# Patient Record
Sex: FEMALE | Race: WHITE | NOT HISPANIC OR LATINO
[De-identification: names, ages, dates, MRNs, and addresses within clinical notes are randomized per-mention and may not be internally consistent; named-entity substitution may affect disease eponyms.]

---

## 2017-01-10 ENCOUNTER — APPOINTMENT (OUTPATIENT)
Dept: DERMATOLOGY | Facility: CLINIC | Age: 22
End: 2017-01-10

## 2017-01-10 VITALS
SYSTOLIC BLOOD PRESSURE: 124 MMHG | WEIGHT: 115 LBS | DIASTOLIC BLOOD PRESSURE: 82 MMHG | HEIGHT: 62 IN | BODY MASS INDEX: 21.16 KG/M2

## 2017-01-10 DIAGNOSIS — L85.8 OTHER SPECIFIED EPIDERMAL THICKENING: ICD-10-CM

## 2017-01-10 DIAGNOSIS — L70.9 ACNE, UNSPECIFIED: ICD-10-CM

## 2017-01-10 DIAGNOSIS — L44.2 LICHEN STRIATUS: ICD-10-CM

## 2017-01-10 RX ORDER — CLINDAMYCIN PHOSPHATE AND TRETINOIN 10; .25 MG/G; MG/G
1.2-0.025 GEL TOPICAL
Qty: 1 | Refills: 2 | Status: ACTIVE | COMMUNITY
Start: 2017-01-10 | End: 1900-01-01

## 2017-01-10 RX ORDER — TRIAMCINOLONE ACETONIDE 1 MG/G
0.1 CREAM TOPICAL
Qty: 1 | Refills: 2 | Status: ACTIVE | COMMUNITY
Start: 2017-01-10 | End: 1900-01-01

## 2017-03-17 ENCOUNTER — APPOINTMENT (OUTPATIENT)
Dept: DERMATOLOGY | Facility: CLINIC | Age: 22
End: 2017-03-17

## 2017-07-26 ENCOUNTER — APPOINTMENT (OUTPATIENT)
Dept: OBGYN | Facility: CLINIC | Age: 22
End: 2017-07-26

## 2018-02-23 ENCOUNTER — TRANSCRIPTION ENCOUNTER (OUTPATIENT)
Age: 23
End: 2018-02-23

## 2018-06-13 ENCOUNTER — TRANSCRIPTION ENCOUNTER (OUTPATIENT)
Age: 23
End: 2018-06-13

## 2019-11-14 ENCOUNTER — TRANSCRIPTION ENCOUNTER (OUTPATIENT)
Age: 24
End: 2019-11-14

## 2019-12-31 ENCOUNTER — APPOINTMENT (OUTPATIENT)
Dept: ENDOCRINOLOGY | Facility: CLINIC | Age: 24
End: 2019-12-31

## 2020-02-24 ENCOUNTER — APPOINTMENT (OUTPATIENT)
Dept: ENDOCRINOLOGY | Facility: CLINIC | Age: 25
End: 2020-02-24
Payer: COMMERCIAL

## 2020-02-24 VITALS
HEIGHT: 62 IN | WEIGHT: 126 LBS | TEMPERATURE: 98.2 F | OXYGEN SATURATION: 99 % | SYSTOLIC BLOOD PRESSURE: 122 MMHG | HEART RATE: 98 BPM | DIASTOLIC BLOOD PRESSURE: 78 MMHG | BODY MASS INDEX: 23.19 KG/M2

## 2020-02-24 PROCEDURE — 76536 US EXAM OF HEAD AND NECK: CPT

## 2020-02-24 PROCEDURE — 99214 OFFICE O/P EST MOD 30 MIN: CPT

## 2020-02-24 NOTE — PROCEDURE
[Eng[employer] e 2008 model, 10-12 MHz frequencies] : multiple real time longitudinal and transverse images were obtained using a high resolution ultrasound with a linear transducer, Eng[employer] e 2008 model, 10-12 MHz frequencies. All measurements will be reported as longitudinal x azalia-posterior x transverse. [Hypothyroidism] : hypothyroidism [FreeTextEntry1] : 3.57 x 2.02 x 2.38 [There are no distinct nodules visualized.] : There are no distinct nodules visualized. [] : a heterogeneous parenchyma [FreeTextEntry5] : 3.29 x 2.19 x 2.43 [FreeTextEntry2] : 1.26

## 2020-02-24 NOTE — IMPRESSION
[FreeTextEntry1] : Enlarged isthmus . Otherwise thyroid heterogenous without distinct nodularity.  Reassess one year.

## 2020-03-15 NOTE — HISTORY OF PRESENT ILLNESS
[FreeTextEntry1] : Ms. CORLEY  is a 24 year  year old female  who presents for endocrine evaluation We had alt met about one year ago at [facility] office.  She does have hzx of recurrent bouts of thyroiditis-about 5-6 each  resolving with normal tft's subsequently.\par Recently noted thyoid had enlarged and noted difficulty sleeping  and cant stay asleep -was dreaming unusually.  Energuy level down somewhat. She presents with regard to a history of Hashimoto's thyroiditis . Additional medical history includes that of acne not taking any medicataion\par Strong FH on dad side for autoimmune with hashimitos and scleroderma, pat aunt with Graves, Pat GM  with hashimitos\par TSH now at 47.5 qith Free T4 at 0.7 Brissa ab's strongly pos ( TG and TPO).\par Menses shorter oflate-did just have cycle.\par Moods off feels "spacy" a ttimes\par Memory not as good.\par Vit d 25-OH at 14.\par

## 2020-03-15 NOTE — PHYSICAL EXAM
[Alert] : alert [No Acute Distress] : no acute distress [Well Nourished] : well nourished [Well Developed] : well developed [Normal Sclera/Conjunctiva] : normal sclera/conjunctiva [No Proptosis] : no proptosis [EOMI] : extra ocular movement intact [Thyroid Not Enlarged] : the thyroid was not enlarged [Normal Oropharynx] : the oropharynx was normal [No Thyroid Nodules] : there were no palpable thyroid nodules [No Respiratory Distress] : no respiratory distress [Normal Rate] : heart rate was normal  [No Accessory Muscle Use] : no accessory muscle use [Clear to Auscultation] : lungs were clear to auscultation bilaterally [Normal S1, S2] : normal S1 and S2 [Pedal Pulses Normal] : the pedal pulses are present [No Edema] : there was no peripheral edema [Regular Rhythm] : with a regular rhythm [Normal Bowel Sounds] : normal bowel sounds [Not Tender] : non-tender [Soft] : abdomen soft [Not Distended] : not distended [Post Cervical Nodes] : posterior cervical nodes [Axillary Nodes] : axillary nodes [Anterior Cervical Nodes] : anterior cervical nodes [Spine Straight] : spine straight [Normal] : normal and non tender [No Spinal Tenderness] : no spinal tenderness [Normal Gait] : normal gait [Normal Strength/Tone] : muscle strength and tone were normal [No Stigmata of Cushings Syndrome] : no stigmata of cushings syndrome [No Rash] : no rash [Oriented x3] : oriented to person, place, and time [No Tremors] : no tremors [Normal Reflexes] : deep tendon reflexes were 2+ and symmetric [Acanthosis Nigricans] : no acanthosis nigricans

## 2020-03-15 NOTE — HISTORY OF PRESENT ILLNESS
[FreeTextEntry1] : Ms. CORLEY  is a 24 year  year old female  who presents for endocrine evaluation We had alt met about one year ago at [facility] office.  She does have hzx of recurrent bouts of thyroiditis-about 5-6 each  resolving with normal tft's subsequently.\par Recently noted thyoid had enlarged and noted difficulty sleeping  and cant stay asleep -was dreaming unusually.  Energuy level down somewhat. She presents with regard to a history of Hashimoto's thyroiditis . Additional medical history includes that of acne not taking any medicataion\par Strong FH on dad side for autoimmune with hashimitos and scleroderma, pat aunt with Graves, Pat GM  with hashimitos\par TSH now at 47.5 qith Free T4 at 0.7 Brissa ab's strongly pos ( TG and TPO).\par Menses shorter oflate-did just have cycle.\par Moods off feels "spacy" a ttimes\par Memory not as good.\par Vit d 25-OH at 14.\par

## 2020-03-15 NOTE — PHYSICAL EXAM
[Alert] : alert [Well Nourished] : well nourished [No Acute Distress] : no acute distress [Well Developed] : well developed [Normal Sclera/Conjunctiva] : normal sclera/conjunctiva [No Proptosis] : no proptosis [EOMI] : extra ocular movement intact [Thyroid Not Enlarged] : the thyroid was not enlarged [Normal Oropharynx] : the oropharynx was normal [No Thyroid Nodules] : there were no palpable thyroid nodules [No Respiratory Distress] : no respiratory distress [Normal Rate] : heart rate was normal  [No Accessory Muscle Use] : no accessory muscle use [Clear to Auscultation] : lungs were clear to auscultation bilaterally [Normal S1, S2] : normal S1 and S2 [No Edema] : there was no peripheral edema [Pedal Pulses Normal] : the pedal pulses are present [Regular Rhythm] : with a regular rhythm [Soft] : abdomen soft [Normal Bowel Sounds] : normal bowel sounds [Not Tender] : non-tender [Post Cervical Nodes] : posterior cervical nodes [Not Distended] : not distended [Anterior Cervical Nodes] : anterior cervical nodes [Axillary Nodes] : axillary nodes [Normal] : normal and non tender [Spine Straight] : spine straight [No Spinal Tenderness] : no spinal tenderness [Normal Gait] : normal gait [Normal Strength/Tone] : muscle strength and tone were normal [No Stigmata of Cushings Syndrome] : no stigmata of cushings syndrome [No Rash] : no rash [Normal Reflexes] : deep tendon reflexes were 2+ and symmetric [Oriented x3] : oriented to person, place, and time [No Tremors] : no tremors [Acanthosis Nigricans] : no acanthosis nigricans

## 2021-03-11 ENCOUNTER — NON-APPOINTMENT (OUTPATIENT)
Age: 26
End: 2021-03-11

## 2021-03-11 RX ORDER — LEVOTHYROXINE SODIUM 0.05 MG/1
50 TABLET ORAL
Qty: 30 | Refills: 0 | Status: DISCONTINUED | COMMUNITY
Start: 2020-02-24 | End: 2021-03-11

## 2021-06-18 ENCOUNTER — APPOINTMENT (OUTPATIENT)
Dept: ENDOCRINOLOGY | Facility: CLINIC | Age: 26
End: 2021-06-18
Payer: COMMERCIAL

## 2021-06-18 VITALS
SYSTOLIC BLOOD PRESSURE: 116 MMHG | OXYGEN SATURATION: 98 % | DIASTOLIC BLOOD PRESSURE: 78 MMHG | WEIGHT: 120 LBS | TEMPERATURE: 98.6 F | HEART RATE: 96 BPM | RESPIRATION RATE: 16 BRPM | BODY MASS INDEX: 21.95 KG/M2

## 2021-06-18 PROCEDURE — 99214 OFFICE O/P EST MOD 30 MIN: CPT | Mod: 25

## 2021-06-18 PROCEDURE — 99072 ADDL SUPL MATRL&STAF TM PHE: CPT

## 2021-06-18 PROCEDURE — 36415 COLL VENOUS BLD VENIPUNCTURE: CPT

## 2021-06-18 NOTE — HISTORY OF PRESENT ILLNESS
[FreeTextEntry1] : Ms. CORLEY  is a 26  year old female  who returns with regard to a hx of recurrent bouts of thyroiditis -about 5-6 each resolving with normal tft's subsequently. \par \par With regard to hypothyroidism, she is taking LT4 25 mcg daily. She  has been compliant in taking the LT4 daily, away from food or any medication that may inhibit absorption. She  has tolerated this medication well without  any apparent adverse effects.  She denies any temperature intolerance, significant weight changes, or severe fatigue. She  in addition denies any palpitations, tremors, anxiousness, change in bowel habits or significant change in moods. \par Pt had recent lab w/u with Dr. Miguel Ángel Adams. \par Pt had episode of tightness sensation in neck region which went away after a day. \par Menstrual cycles somewhat shorter, prior to that cycles were 36 days long. \par \par Additional medical history includes that of acne and vitamin d deficiency. Not taking any acne medications. \par Taking claritin up to 3x daily for hives. Too, taking pepcid.  \par Strong FH on dad side for autoimmune with hashimitos and scleroderma, pat aunt with Graves, Pat GM  with hashimitos

## 2021-07-22 ENCOUNTER — NON-APPOINTMENT (OUTPATIENT)
Age: 26
End: 2021-07-22

## 2021-07-27 ENCOUNTER — NON-APPOINTMENT (OUTPATIENT)
Age: 26
End: 2021-07-27

## 2021-08-12 ENCOUNTER — NON-APPOINTMENT (OUTPATIENT)
Age: 26
End: 2021-08-12

## 2021-09-27 ENCOUNTER — APPOINTMENT (OUTPATIENT)
Dept: ENDOCRINOLOGY | Facility: CLINIC | Age: 26
End: 2021-09-27
Payer: COMMERCIAL

## 2021-11-15 ENCOUNTER — APPOINTMENT (OUTPATIENT)
Dept: ENDOCRINOLOGY | Facility: CLINIC | Age: 26
End: 2021-11-15
Payer: COMMERCIAL

## 2021-11-15 VITALS
HEIGHT: 62 IN | TEMPERATURE: 98.1 F | DIASTOLIC BLOOD PRESSURE: 70 MMHG | SYSTOLIC BLOOD PRESSURE: 116 MMHG | WEIGHT: 122 LBS | HEART RATE: 77 BPM | BODY MASS INDEX: 22.45 KG/M2 | OXYGEN SATURATION: 99 %

## 2021-11-15 PROCEDURE — 99214 OFFICE O/P EST MOD 30 MIN: CPT

## 2021-11-15 NOTE — HISTORY OF PRESENT ILLNESS
[FreeTextEntry1] : Ms. CORLEY is a 26 year old female who returns with regard to a hx of recurrent bouts of thyroiditis -about 5-6 each resolving with normal tft's subsequently. \par \par With regard to hypothyroidism, she is taking LT4 50 mcg daily. She has been compliant in taking the LT4 daily, away from food or any medication that may inhibit absorption. She has tolerated this medication well without any apparent adverse effects. She denies any temperature intolerance, significant weight changes, or severe fatigue. She in addition denies any palpitations, tremors, anxiousness, change in bowel habits or significant change in moods. \par August 2021 TSH was 34.68- dose of LT4 was increased from 25 mcg. \par Most recent blood work showed TSH was 3.1, vitamin D 25-OH was 19. \par Has been experiencing headaches at the back of the head for the past weeks and pain in the inner ear- will follow with PMD or ENT\par Pt had recent lab w/u with Dr. Miguel Ángel Adams. \par Pt had episode of tightness sensation in neck region which went away after a day. \par Menstrual cycles somewhat shorter, prior to that cycles were 36 days long. \par \par Additional medical history includes that of acne and vitamin d deficiency. Not taking any acne medications. \par Taking claritin up to 3x daily for hives. Too, taking pepcid. \par Strong FH on dad side for autoimmune with hashimitos and scleroderma, pat aunt with Graves, Pat GM with hashimitos

## 2022-07-29 ENCOUNTER — APPOINTMENT (OUTPATIENT)
Dept: ENDOCRINOLOGY | Facility: CLINIC | Age: 27
End: 2022-07-29

## 2022-07-29 VITALS
DIASTOLIC BLOOD PRESSURE: 65 MMHG | WEIGHT: 127.56 LBS | OXYGEN SATURATION: 98 % | HEIGHT: 62 IN | HEART RATE: 79 BPM | SYSTOLIC BLOOD PRESSURE: 115 MMHG | TEMPERATURE: 98.5 F | BODY MASS INDEX: 23.47 KG/M2

## 2022-07-29 PROCEDURE — 36415 COLL VENOUS BLD VENIPUNCTURE: CPT

## 2022-07-29 PROCEDURE — 99214 OFFICE O/P EST MOD 30 MIN: CPT | Mod: 25

## 2022-07-29 NOTE — HISTORY OF PRESENT ILLNESS
[FreeTextEntry1] : Ms. CORLEY is a 27 year old female who returns with regard to a hx of recurrent bouts of thyroiditis -about 5-6 each resolving with normal tft's subsequently. \par \par With regard to hypothyroidism, she is taking LT4 50 mcg daily. She has been compliant in taking the LT4 daily, away from food or any medication that may inhibit absorption. She has tolerated this medication well without any apparent adverse effects. She denies any temperature intolerance, significant weight changes, or severe fatigue. She in addition denies any palpitations, tremors, anxiousness, change in bowel habits or significant change in moods. \par She notes she feels nauseas at times, unable to sleep and sweating and brain fog. No headaches\par \par Additional medical history includes that of acne and vitamin d deficiency. Not taking any acne medications. \par She was prescribed vitamin D3 50,000 iu weekly but has been off as of late.\par Taking claritin up to 3x daily for hives. Too, taking pepcid. \par Strong FH on dad side for autoimmune with hashimitos and scleroderma, pat aunt with Graves, Pat GM with hashimitos \par

## 2022-07-29 NOTE — PHYSICAL EXAM
[Alert] : alert [Well Nourished] : well nourished [No Acute Distress] : no acute distress [Well Developed] : well developed [Normal Sclera/Conjunctiva] : normal sclera/conjunctiva [EOMI] : extra ocular movement intact [No Proptosis] : no proptosis [Normal Oropharynx] : the oropharynx was normal [Thyroid Not Enlarged] : the thyroid was not enlarged [No Thyroid Nodules] : no palpable thyroid nodules [No Respiratory Distress] : no respiratory distress [No Accessory Muscle Use] : no accessory muscle use [Clear to Auscultation] : lungs were clear to auscultation bilaterally [Normal Rate] : heart rate was normal [Normal S1, S2] : normal S1 and S2 [Regular Rhythm] : with a regular rhythm [No Edema] : no peripheral edema [Pedal Pulses Normal] : the pedal pulses are present [Normal Bowel Sounds] : normal bowel sounds [Not Tender] : non-tender [Not Distended] : not distended [Soft] : abdomen soft [Normal Anterior Cervical Nodes] : no anterior cervical lymphadenopathy [Normal Posterior Cervical Nodes] : no posterior cervical lymphadenopathy [No Spinal Tenderness] : no spinal tenderness [Spine Straight] : spine straight [No Stigmata of Cushings Syndrome] : no stigmata of Cushings Syndrome [Normal Gait] : normal gait [Normal Strength/Tone] : muscle strength and tone were normal [No Rash] : no rash [Normal Reflexes] : deep tendon reflexes were 2+ and symmetric [No Tremors] : no tremors [Oriented x3] : oriented to person, place, and time [Acanthosis Nigricans] : no acanthosis nigricans

## 2022-08-01 LAB
25(OH)D3 SERPL-MCNC: 29.5 NG/ML
ALBUMIN SERPL ELPH-MCNC: 4.2 G/DL
ALP BLD-CCNC: 89 U/L
ALT SERPL-CCNC: 19 U/L
ANION GAP SERPL CALC-SCNC: 12 MMOL/L
AST SERPL-CCNC: 27 U/L
BASOPHILS # BLD AUTO: 0.02 K/UL
BASOPHILS NFR BLD AUTO: 0.2 %
BILIRUB SERPL-MCNC: 0.7 MG/DL
BUN SERPL-MCNC: 11 MG/DL
CALCIUM SERPL-MCNC: 9.4 MG/DL
CHLORIDE SERPL-SCNC: 104 MMOL/L
CO2 SERPL-SCNC: 21 MMOL/L
CORTIS SERPL-MCNC: 6.8 UG/DL
CREAT SERPL-MCNC: 0.73 MG/DL
EGFR: 115 ML/MIN/1.73M2
EOSINOPHIL # BLD AUTO: 0.13 K/UL
EOSINOPHIL NFR BLD AUTO: 1.6 %
FERRITIN SERPL-MCNC: 107 NG/ML
FOLATE SERPL-MCNC: 9.2 NG/ML
GLUCOSE SERPL-MCNC: 85 MG/DL
HCT VFR BLD CALC: 38.9 %
HGB BLD-MCNC: 13.2 G/DL
IMM GRANULOCYTES NFR BLD AUTO: 0.5 %
IRON SERPL-MCNC: 88 UG/DL
LYMPHOCYTES # BLD AUTO: 1.99 K/UL
LYMPHOCYTES NFR BLD AUTO: 24.4 %
MAN DIFF?: NORMAL
MCHC RBC-ENTMCNC: 29.3 PG
MCHC RBC-ENTMCNC: 33.9 GM/DL
MCV RBC AUTO: 86.3 FL
MONOCYTES # BLD AUTO: 0.51 K/UL
MONOCYTES NFR BLD AUTO: 6.2 %
NEUTROPHILS # BLD AUTO: 5.48 K/UL
NEUTROPHILS NFR BLD AUTO: 67.1 %
PLATELET # BLD AUTO: 213 K/UL
POTASSIUM SERPL-SCNC: 4 MMOL/L
PROT SERPL-MCNC: 7.5 G/DL
RBC # BLD: 4.51 M/UL
RBC # FLD: 11.9 %
SODIUM SERPL-SCNC: 137 MMOL/L
T3FREE SERPL-MCNC: 3.5 PG/ML
T4 FREE SERPL-MCNC: 1.8 NG/DL
THYROGLOB AB SERPL-ACNC: <20 IU/ML
THYROPEROXIDASE AB SERPL IA-ACNC: 237 IU/ML
TSH SERPL-ACNC: 1.57 UIU/ML
VIT B12 SERPL-MCNC: 570 PG/ML
WBC # FLD AUTO: 8.17 K/UL

## 2022-08-08 LAB
CORTICOSTEROID BIND GLOBULIN: 2.4 MG/DL
CORTIS SERPL-MCNC: 7 UG/DL
CORTISOL, FREE: 0.25 UG/DL
PFCX: 3.6 %

## 2022-08-25 RX ORDER — ERGOCALCIFEROL 1.25 MG/1
1.25 MG CAPSULE, LIQUID FILLED ORAL
Qty: 4 | Refills: 3 | Status: DISCONTINUED | COMMUNITY
Start: 2021-11-15 | End: 2022-08-25

## 2022-08-25 RX ORDER — VITAMIN K2 90 MCG
125 MCG CAPSULE ORAL
Qty: 1 | Refills: 0 | Status: ACTIVE | COMMUNITY
Start: 2022-08-25

## 2022-10-24 DIAGNOSIS — R53.82 CHRONIC FATIGUE, UNSPECIFIED: ICD-10-CM

## 2022-12-29 ENCOUNTER — NON-APPOINTMENT (OUTPATIENT)
Age: 27
End: 2022-12-29

## 2022-12-29 DIAGNOSIS — R79.89 OTHER SPECIFIED ABNORMAL FINDINGS OF BLOOD CHEMISTRY: ICD-10-CM

## 2023-05-05 ENCOUNTER — TRANSCRIPTION ENCOUNTER (OUTPATIENT)
Age: 28
End: 2023-05-05

## 2023-05-05 ENCOUNTER — APPOINTMENT (OUTPATIENT)
Dept: ENDOCRINOLOGY | Facility: CLINIC | Age: 28
End: 2023-05-05
Payer: COMMERCIAL

## 2023-05-05 VITALS
OXYGEN SATURATION: 98 % | TEMPERATURE: 98 F | HEIGHT: 62 IN | WEIGHT: 122.44 LBS | HEART RATE: 82 BPM | SYSTOLIC BLOOD PRESSURE: 100 MMHG | BODY MASS INDEX: 22.53 KG/M2 | DIASTOLIC BLOOD PRESSURE: 68 MMHG

## 2023-05-05 DIAGNOSIS — Z00.00 ENCOUNTER FOR GENERAL ADULT MEDICAL EXAMINATION W/OUT ABNORMAL FINDINGS: ICD-10-CM

## 2023-05-05 DIAGNOSIS — E06.1 SUBACUTE THYROIDITIS: ICD-10-CM

## 2023-05-05 DIAGNOSIS — E06.3 AUTOIMMUNE THYROIDITIS: ICD-10-CM

## 2023-05-05 DIAGNOSIS — E03.9 HYPOTHYROIDISM, UNSPECIFIED: ICD-10-CM

## 2023-05-05 DIAGNOSIS — E55.9 VITAMIN D DEFICIENCY, UNSPECIFIED: ICD-10-CM

## 2023-05-05 PROCEDURE — 99214 OFFICE O/P EST MOD 30 MIN: CPT

## 2023-05-12 ENCOUNTER — TRANSCRIPTION ENCOUNTER (OUTPATIENT)
Age: 28
End: 2023-05-12

## 2023-05-12 NOTE — HISTORY OF PRESENT ILLNESS
[FreeTextEntry1] : Ms. CORLEY  is a 28 year old female  who returns with regard to a  pst hx of apparent  recurrent bouts of thyroiditis -about 5-6 each  resolving with normal tft's subsequently.\par \par She too does have underlying Hashimoto's thyroiditis . She is currently taking Synthroid 75 mcg daily. \par \par For the past few months she has been waking up fatigued despite getting 10 hours of sleep. Too, does note symptoms of elevated heart rate even at rest. Denies having had a virus within the past few months. Too, denies a geovanna, gritty feeling in her eyes. No tearing or redness in her eyes. \par Notes that she had a cold about 2 weeks ago and her thyroid felt inflamed- felt nauseous and feeling of a lump in her throat\par . \par Additional medical history includes that of acne not taking any medication\par \par Strong FH on dad side for autoimmune with Hashimoto's and scleroderma, Raynaud's , pat aunt with Graves, Pat GM  with Hashimoto\par \par Vit d 25-OH low in past. Taking selenium, vit b and d \par \par Menstrual cycles typically about 30-45 days. Follows with GYN annually.

## 2024-02-26 ENCOUNTER — NON-APPOINTMENT (OUTPATIENT)
Age: 29
End: 2024-02-26

## 2024-04-27 ENCOUNTER — RX RENEWAL (OUTPATIENT)
Age: 29
End: 2024-04-27

## 2024-04-27 RX ORDER — LEVOTHYROXINE SODIUM 75 UG/1
75 TABLET ORAL DAILY
Qty: 90 | Refills: 1 | Status: ACTIVE | COMMUNITY
Start: 2021-03-11 | End: 1900-01-01

## 2024-07-22 ENCOUNTER — APPOINTMENT (OUTPATIENT)
Dept: ENDOCRINOLOGY | Facility: CLINIC | Age: 29
End: 2024-07-22
Payer: COMMERCIAL

## 2024-07-22 VITALS
HEIGHT: 62 IN | WEIGHT: 127 LBS | OXYGEN SATURATION: 98 % | DIASTOLIC BLOOD PRESSURE: 62 MMHG | TEMPERATURE: 98 F | RESPIRATION RATE: 16 BRPM | BODY MASS INDEX: 23.37 KG/M2 | HEART RATE: 96 BPM | SYSTOLIC BLOOD PRESSURE: 94 MMHG

## 2024-07-22 DIAGNOSIS — E04.9 NONTOXIC GOITER, UNSPECIFIED: ICD-10-CM

## 2024-07-22 DIAGNOSIS — R53.82 CHRONIC FATIGUE, UNSPECIFIED: ICD-10-CM

## 2024-07-22 DIAGNOSIS — E06.3 AUTOIMMUNE THYROIDITIS: ICD-10-CM

## 2024-07-22 DIAGNOSIS — E03.9 HYPOTHYROIDISM, UNSPECIFIED: ICD-10-CM

## 2024-07-22 DIAGNOSIS — R63.5 ABNORMAL WEIGHT GAIN: ICD-10-CM

## 2024-07-22 DIAGNOSIS — E55.9 VITAMIN D DEFICIENCY, UNSPECIFIED: ICD-10-CM

## 2024-07-22 PROCEDURE — 99214 OFFICE O/P EST MOD 30 MIN: CPT

## 2024-07-23 RX ORDER — CHOLECALCIFEROL (VITAMIN D3) 1250 MCG
1.25 MG CAPSULE ORAL
Qty: 4 | Refills: 1 | Status: ACTIVE | COMMUNITY
Start: 2024-07-23 | End: 1900-01-01

## 2024-07-23 NOTE — HISTORY OF PRESENT ILLNESS
[FreeTextEntry1] : Ms. CORLEY is a 29-year old female who returns with regard to a past hx of apparent recurrent bouts of thyroiditis -about 5-6 each resolving with normal tft's subsequently.  She too does have underlying Hashimoto's thyroiditis. She is currently taking Synthroid 75 mcg daily. States she did not feel improved on generic Levothyroxine in the past.   She has been compliant in taking the LT4 daily, away from food or any medication that may inhibit absorption. She has tolerated this medication well without any apparent adverse effects.  She denies any temperature intolerance. She in addition denies any palpitations, tremors, anxiousness, change in bowel habits or significant change in moods.  Recent TFTs on 7/16/24 returned with TSH elevated at 12.3. FT4 was normal at 1.14.   Patient reports fatigue despite sleeping 10+ hours.  Reports weight gain of about 9 pounds in the last 5-6 weeks. Diet has not changed- mostly has home-cooked meals including chicken, pork, and beef. Takeout if usually sushi. Her current weight is 127 pounds.  States she sometimes wakes up with GI upset and has vomited a few times after taking LT4 with water.   Strong FH on dad side for autoimmune with Hashimoto's and scleroderma, Raynaud's, paternal aunt with Graves, Pat GM with Hashimoto  Additional medical history includes that of acne, vitamin D deficiency - Menstrual cycles typically about 30-45 days. Follows with GYN annually   Vitamin D 25-OH low at 23 on labs 7/16/24. Not taking supplement.

## 2024-07-23 NOTE — ADDENDUM
[FreeTextEntry1] : This note was written by Deysi Marx on 07/22/2024 acting as medical scribe for Dr. Celso Medina. I, Dr. Celso Medina, have read and attest that all the information, medical decision making and discharge instructions within are true and accurate.

## 2025-01-06 ENCOUNTER — APPOINTMENT (OUTPATIENT)
Dept: ENDOCRINOLOGY | Facility: CLINIC | Age: 30
End: 2025-01-06
Payer: COMMERCIAL

## 2025-01-06 VITALS
BODY MASS INDEX: 23.57 KG/M2 | HEART RATE: 78 BPM | HEIGHT: 62 IN | SYSTOLIC BLOOD PRESSURE: 109 MMHG | WEIGHT: 128.06 LBS | TEMPERATURE: 97 F | DIASTOLIC BLOOD PRESSURE: 70 MMHG | OXYGEN SATURATION: 99 %

## 2025-01-06 DIAGNOSIS — E55.9 VITAMIN D DEFICIENCY, UNSPECIFIED: ICD-10-CM

## 2025-01-06 DIAGNOSIS — R63.5 ABNORMAL WEIGHT GAIN: ICD-10-CM

## 2025-01-06 DIAGNOSIS — E03.9 HYPOTHYROIDISM, UNSPECIFIED: ICD-10-CM

## 2025-01-06 DIAGNOSIS — E06.3 AUTOIMMUNE THYROIDITIS: ICD-10-CM

## 2025-01-06 PROCEDURE — 99214 OFFICE O/P EST MOD 30 MIN: CPT

## 2025-01-06 PROCEDURE — G2211 COMPLEX E/M VISIT ADD ON: CPT | Mod: NC

## 2025-02-05 ENCOUNTER — NON-APPOINTMENT (OUTPATIENT)
Age: 30
End: 2025-02-05

## 2025-02-05 DIAGNOSIS — E03.9 HYPOTHYROIDISM, UNSPECIFIED: ICD-10-CM

## 2025-02-12 RX ORDER — THYROID, PORCINE 60 MG/1
60 TABLET ORAL
Qty: 90 | Refills: 1 | Status: ACTIVE | COMMUNITY
Start: 2025-02-12 | End: 1900-01-01

## 2025-05-12 ENCOUNTER — APPOINTMENT (OUTPATIENT)
Dept: ENDOCRINOLOGY | Facility: CLINIC | Age: 30
End: 2025-05-12

## 2025-05-22 ENCOUNTER — APPOINTMENT (OUTPATIENT)
Dept: ENDOCRINOLOGY | Facility: CLINIC | Age: 30
End: 2025-05-22
Payer: COMMERCIAL

## 2025-05-22 VITALS
OXYGEN SATURATION: 98 % | DIASTOLIC BLOOD PRESSURE: 76 MMHG | TEMPERATURE: 97.6 F | WEIGHT: 129 LBS | SYSTOLIC BLOOD PRESSURE: 110 MMHG | BODY MASS INDEX: 23.74 KG/M2 | HEIGHT: 62 IN | HEART RATE: 99 BPM

## 2025-05-22 DIAGNOSIS — E03.9 HYPOTHYROIDISM, UNSPECIFIED: ICD-10-CM

## 2025-05-22 DIAGNOSIS — E04.9 NONTOXIC GOITER, UNSPECIFIED: ICD-10-CM

## 2025-05-22 DIAGNOSIS — E06.3 AUTOIMMUNE THYROIDITIS: ICD-10-CM

## 2025-05-22 DIAGNOSIS — E55.9 VITAMIN D DEFICIENCY, UNSPECIFIED: ICD-10-CM

## 2025-05-22 PROCEDURE — G2211 COMPLEX E/M VISIT ADD ON: CPT | Mod: NC

## 2025-05-22 PROCEDURE — 99214 OFFICE O/P EST MOD 30 MIN: CPT

## 2025-05-28 ENCOUNTER — NON-APPOINTMENT (OUTPATIENT)
Age: 30
End: 2025-05-28